# Patient Record
Sex: FEMALE | Race: WHITE | URBAN - NONMETROPOLITAN AREA
[De-identification: names, ages, dates, MRNs, and addresses within clinical notes are randomized per-mention and may not be internally consistent; named-entity substitution may affect disease eponyms.]

---

## 2023-05-26 ENCOUNTER — WEB ENCOUNTER (OUTPATIENT)
Dept: URBAN - NONMETROPOLITAN AREA CLINIC 13 | Facility: CLINIC | Age: 33
End: 2023-05-26

## 2023-05-26 ENCOUNTER — LAB OUTSIDE AN ENCOUNTER (OUTPATIENT)
Dept: URBAN - NONMETROPOLITAN AREA CLINIC 13 | Facility: CLINIC | Age: 33
End: 2023-05-26

## 2023-05-26 ENCOUNTER — CLAIMS CREATED FROM THE CLAIM WINDOW (OUTPATIENT)
Dept: URBAN - NONMETROPOLITAN AREA CLINIC 13 | Facility: CLINIC | Age: 33
End: 2023-05-26

## 2023-05-26 ENCOUNTER — OFFICE VISIT (OUTPATIENT)
Dept: URBAN - NONMETROPOLITAN AREA CLINIC 13 | Facility: CLINIC | Age: 33
End: 2023-05-26

## 2023-05-26 VITALS
HEIGHT: 66 IN | BODY MASS INDEX: 33.11 KG/M2 | HEART RATE: 96 BPM | WEIGHT: 206 LBS | SYSTOLIC BLOOD PRESSURE: 124 MMHG | DIASTOLIC BLOOD PRESSURE: 86 MMHG

## 2023-05-26 DIAGNOSIS — Z80.0 FAMILY HISTORY OF COLON CANCER: ICD-10-CM

## 2023-05-26 DIAGNOSIS — R10.31 RIGHT LOWER QUADRANT ABDOMINAL PAIN: ICD-10-CM

## 2023-05-26 DIAGNOSIS — K92.1 HEMATOCHEZIA: ICD-10-CM

## 2023-05-26 PROBLEM — 312824007: Status: ACTIVE | Noted: 2023-05-26

## 2023-05-26 PROCEDURE — 99244 OFF/OP CNSLTJ NEW/EST MOD 40: CPT | Performed by: INTERNAL MEDICINE

## 2023-05-26 PROCEDURE — 99204 OFFICE O/P NEW MOD 45 MIN: CPT | Performed by: INTERNAL MEDICINE

## 2023-05-26 RX ORDER — SODIUM, POTASSIUM,MAG SULFATES 17.5-3.13G
AS DIRECTED SOLUTION, RECONSTITUTED, ORAL ORAL
Qty: 1 | Refills: 0 | OUTPATIENT
Start: 2023-05-26 | End: 2023-05-28

## 2023-05-26 NOTE — HPI-TODAY'S VISIT:
5/26/2023  Ms. Grullon presents to clinic referred by Mariah Juarez NP for evaluation of abdominal pain and bloody bowel movements. A copy of this note and recommendations will be forwarded to her office.  She is here after her emergency room visit after episode of increased pain with hematochezia with mucus. CT of abdomen/pelvis with contrast 5/5/2023 noted constipation and she was directed to follow-up with gastroenterology.   She previously was seen by Dr. Gorman and Dr. Schuster for alternating bowel habits and advised to trial dietary elimination, later did have a trial of amitiza with Dr. Victor and ultimately has been managing with non -prescription medication since we last saw her. She does have a long history of abnormal bowel movements and abdominal discomfort.  Most recently concerning to her is the new episode of a bowel movement of just blood and mucous, she presents a photo for further inform. Additionally, she states she has a first cousin newly diagnosed with precancerous polyps on colonoscopy at age 23.  She does have a family history of colon cancer with a great grandparent, grandparent and aunt with celiac disease.  She is interested in further evaluation including colonoscopy. She denies unintentional weight loss.  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 [place]

## 2023-05-30 LAB
A/G RATIO: 1.7
ALBUMIN: 4.7
ALKALINE PHOSPHATASE: 60
ALT (SGPT): 11
AST (SGOT): 8
BILIRUBIN, TOTAL: 0.3
BUN/CREATININE RATIO: (no result)
BUN: 16
C-REACTIVE PROTEIN, QUANT: 1.5
CALCIUM: 9.4
CARBON DIOXIDE, TOTAL: 22
CHLORIDE: 103
CREATININE: 0.93
EGFR: 84
GLOBULIN, TOTAL: 2.7
GLUCOSE: 86
IMMUNOGLOBULIN A: 111
INTERPRETATION: (no result)
IRON BIND.CAP.(TIBC): 416
IRON SATURATION: 25
IRON: 102
POTASSIUM: 4
PROTEIN, TOTAL: 7.4
SED RATE BY MODIFIED: 6
SODIUM: 140
TISSUE TRANSGLUTAMINASE AB, IGA: <1
TSH W/REFLEX TO FT4: 1.25

## 2023-06-01 ENCOUNTER — TELEPHONE ENCOUNTER (OUTPATIENT)
Dept: URBAN - NONMETROPOLITAN AREA CLINIC 2 | Facility: CLINIC | Age: 33
End: 2023-06-01

## 2023-07-18 ENCOUNTER — WEB ENCOUNTER (OUTPATIENT)
Dept: URBAN - NONMETROPOLITAN AREA SURGERY CENTER 1 | Facility: SURGERY CENTER | Age: 33
End: 2023-07-18

## 2023-07-24 ENCOUNTER — OFFICE VISIT (OUTPATIENT)
Dept: URBAN - NONMETROPOLITAN AREA SURGERY CENTER 1 | Facility: SURGERY CENTER | Age: 33
End: 2023-07-24

## 2023-07-24 DIAGNOSIS — K92.1 HEMATOCHEZIA: ICD-10-CM

## 2023-07-24 PROCEDURE — G8907 PT DOC NO EVENTS ON DISCHARG: HCPCS | Performed by: INTERNAL MEDICINE

## 2023-07-24 PROCEDURE — 45378 DIAGNOSTIC COLONOSCOPY: CPT | Performed by: INTERNAL MEDICINE

## 2023-08-30 ENCOUNTER — LAB OUTSIDE AN ENCOUNTER (OUTPATIENT)
Dept: URBAN - NONMETROPOLITAN AREA CLINIC 2 | Facility: CLINIC | Age: 33
End: 2023-08-30

## 2023-09-06 LAB — CALPROTECTIN, FECAL: 912

## 2023-09-07 ENCOUNTER — OFFICE VISIT (OUTPATIENT)
Dept: URBAN - NONMETROPOLITAN AREA CLINIC 13 | Facility: CLINIC | Age: 33
End: 2023-09-07

## 2023-09-07 VITALS
SYSTOLIC BLOOD PRESSURE: 113 MMHG | WEIGHT: 211.4 LBS | BODY MASS INDEX: 33.97 KG/M2 | DIASTOLIC BLOOD PRESSURE: 78 MMHG | HEIGHT: 66 IN | HEART RATE: 90 BPM

## 2023-09-07 DIAGNOSIS — K92.1 HEMATOCHEZIA: ICD-10-CM

## 2023-09-07 DIAGNOSIS — K21.9 GASTROESOPHAGEAL REFLUX DISEASE, UNSPECIFIED WHETHER ESOPHAGITIS PRESENT: ICD-10-CM

## 2023-09-07 DIAGNOSIS — R10.31 RIGHT LOWER QUADRANT ABDOMINAL PAIN: ICD-10-CM

## 2023-09-07 DIAGNOSIS — Z80.0 FAMILY HISTORY OF COLON CANCER: ICD-10-CM

## 2023-09-07 PROBLEM — 301754002: Status: ACTIVE | Noted: 2023-05-26

## 2023-09-07 PROCEDURE — 99213 OFFICE O/P EST LOW 20 MIN: CPT | Performed by: NURSE PRACTITIONER

## 2023-09-07 RX ORDER — FAMOTIDINE 40 MG/1
1 TABLET AT BEDTIME AS NEEDED TABLET, FILM COATED ORAL ONCE A DAY
Qty: 30 | Refills: 5 | OUTPATIENT
Start: 2023-09-07

## 2023-09-07 RX ORDER — OMEPRAZOLE 40 MG/1
1 CAPSULE 30 MINUTES BEFORE MORNING MEAL CAPSULE, DELAYED RELEASE ORAL ONCE A DAY
Qty: 30 | OUTPATIENT
Start: 2023-09-07

## 2023-09-07 NOTE — HPI-OTHER HISTORIES
5/26/23:  Ms. Grullon presents to clinic referred by Mariah Juarez NP for evaluation of abdominal pain and bloody bowel movements. A copy of this note and recommendations will be forwarded to her office. She is here after her emergency room visit after episode of increased pain with hematochezia with mucus. CT of abdomen/pelvis with contrast 5/5/2023 noted constipation and she was directed to follow-up with gastroenterology. She previously was seen by Dr. Gorman and Dr. Schuster for alternating bowel habits and advised to trial dietary elimination, later did have a trial of amitiza with Dr. Victor and ultimately has been managing with non -prescription medication since we last saw her. She does have a long history of abnormal bowel movements and abdominal discomfort. Most recently concerning to her is the new episode of a bowel movement of just blood and mucous, she presents a photo for further inform. Additionally, she states she has a first cousin newly diagnosed with precancerous polyps on colonoscopy at age 23. She does have a family history of colon cancer with a great grandparent, grandparent and aunt with celiac disease. She is interested in further evaluation including colonoscopy. She denies unintentional weight loss. 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 [place]

## 2023-09-07 NOTE — HPI-TODAY'S VISIT:
Ms. Karin Grullon returns for follow-up of abdominal pain and bloody bowel movements.  She underwent colonoscopy that was entirely normal.  Fortunately she has not experienced abdominal pain or any blood in her stool since this time.  She has also been going to the bathroom every day which is a welcome change to bowel habits.  She does however report worsening reflux symptoms.  She has experienced some vomiting in the evening.  She has been trying to stay off later in the ED earlier but her symptoms persist.  She denies any dysphagia.  She is not taking any over-the-counter PPIs or acid reducers. LG.

## 2023-09-08 ENCOUNTER — TELEPHONE ENCOUNTER (OUTPATIENT)
Dept: URBAN - NONMETROPOLITAN AREA CLINIC 2 | Facility: CLINIC | Age: 33
End: 2023-09-08

## 2023-12-07 ENCOUNTER — OFFICE VISIT (OUTPATIENT)
Dept: URBAN - NONMETROPOLITAN AREA CLINIC 13 | Facility: CLINIC | Age: 33
End: 2023-12-07

## 2023-12-19 ENCOUNTER — OFFICE VISIT (OUTPATIENT)
Dept: URBAN - NONMETROPOLITAN AREA CLINIC 13 | Facility: CLINIC | Age: 33
End: 2023-12-19

## 2024-01-02 ENCOUNTER — OFFICE VISIT (OUTPATIENT)
Dept: URBAN - NONMETROPOLITAN AREA CLINIC 2 | Facility: CLINIC | Age: 34
End: 2024-01-02

## 2024-01-16 ENCOUNTER — OFFICE VISIT (OUTPATIENT)
Dept: URBAN - NONMETROPOLITAN AREA CLINIC 13 | Facility: CLINIC | Age: 34
End: 2024-01-16

## 2024-01-16 ENCOUNTER — DASHBOARD ENCOUNTERS (OUTPATIENT)
Age: 34
End: 2024-01-16

## 2024-01-16 VITALS
BODY MASS INDEX: 35.52 KG/M2 | WEIGHT: 221 LBS | HEIGHT: 66 IN | HEART RATE: 82 BPM | DIASTOLIC BLOOD PRESSURE: 84 MMHG | SYSTOLIC BLOOD PRESSURE: 128 MMHG

## 2024-01-16 DIAGNOSIS — K59.09 OTHER CONSTIPATION: ICD-10-CM

## 2024-01-16 DIAGNOSIS — K21.9 GASTROESOPHAGEAL REFLUX DISEASE, UNSPECIFIED WHETHER ESOPHAGITIS PRESENT: ICD-10-CM

## 2024-01-16 DIAGNOSIS — R10.31 RIGHT LOWER QUADRANT ABDOMINAL PAIN: ICD-10-CM

## 2024-01-16 DIAGNOSIS — K92.1 HEMATOCHEZIA: ICD-10-CM

## 2024-01-16 DIAGNOSIS — Z80.0 FAMILY HISTORY OF COLON CANCER: ICD-10-CM

## 2024-01-16 PROBLEM — 235595009: Status: ACTIVE | Noted: 2023-09-07

## 2024-01-16 PROBLEM — 449341000124102: Status: ACTIVE | Noted: 2023-05-26

## 2024-01-16 PROCEDURE — 99214 OFFICE O/P EST MOD 30 MIN: CPT | Performed by: NURSE PRACTITIONER

## 2024-01-16 RX ORDER — FAMOTIDINE 40 MG/1
1 TABLET AT BEDTIME AS NEEDED TABLET, FILM COATED ORAL ONCE A DAY
Qty: 30 | Refills: 5 | OUTPATIENT

## 2024-01-16 RX ORDER — FAMOTIDINE 40 MG/1
1 TABLET AT BEDTIME AS NEEDED TABLET, FILM COATED ORAL ONCE A DAY
Qty: 30 | Refills: 5 | Status: ACTIVE | COMMUNITY
Start: 2023-09-07

## 2024-01-16 RX ORDER — OMEPRAZOLE 40 MG/1
1 CAPSULE 30 MINUTES BEFORE MORNING MEAL CAPSULE, DELAYED RELEASE ORAL ONCE A DAY
Qty: 30 | Status: ACTIVE | COMMUNITY
Start: 2023-09-07

## 2024-01-16 NOTE — HPI-OTHER HISTORIES
9/7/23: Ms. Karin Grullon returns for follow-up of abdominal pain and bloody bowel movements. She underwent colonoscopy that was entirely normal. Fortunately she has not experienced abdominal pain or any blood in her stool since this time. She has also been going to the bathroom every day which is a welcome change to bowel habits. She does however report worsening reflux symptoms. She has experienced some vomiting in the evening. She has been trying to stay off later in the ED earlier but her symptoms persist. She denies any dysphagia. She is not taking any over-the-counter PPIs or acid reducers. LG.  5/26/23:  Ms. Grullon presents to clinic referred by Mariah Juarez NP for evaluation of abdominal pain and bloody bowel movements. A copy of this note and recommendations will be forwarded to her office. She is here after her emergency room visit after episode of increased pain with hematochezia with mucus. CT of abdomen/pelvis with contrast 5/5/2023 noted constipation and she was directed to follow-up with gastroenterology. She previously was seen by Dr. Gorman and Dr. Schuster for alternating bowel habits and advised to trial dietary elimination, later did have a trial of amitiza with Dr. Victor and ultimately has been managing with non -prescription medication since we last saw her. She does have a long history of abnormal bowel movements and abdominal discomfort. Most recently concerning to her is the new episode of a bowel movement of just blood and mucous, she presents a photo for further inform. Additionally, she states she has a first cousin newly diagnosed with precancerous polyps on colonoscopy at age 23. She does have a family history of colon cancer with a great grandparent, grandparent and aunt with celiac disease. She is interested in further evaluation including colonoscopy. She denies unintentional weight loss. 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

## 2024-01-16 NOTE — HPI-TODAY'S VISIT:
Ms. Karin Grullon is a 33-year-old female who presents today for follow-up on reflux.  She reports that famotidine as needed has controlled her symptoms and she is no longer vomiting in the evenings.  Nausea is also well-controlled.  Not using PPI. She recently switched birth controls and has noticed increased bloating and "drier stools ".  She has not experienced any further hematochezia. No weight loss. Recent labs with PCP reportedly normal. LG.